# Patient Record
Sex: FEMALE | Race: WHITE
[De-identification: names, ages, dates, MRNs, and addresses within clinical notes are randomized per-mention and may not be internally consistent; named-entity substitution may affect disease eponyms.]

---

## 2019-11-18 ENCOUNTER — HOSPITAL ENCOUNTER (EMERGENCY)
Dept: HOSPITAL 57 - BURERS | Age: 34
Discharge: HOME | End: 2019-11-18
Payer: COMMERCIAL

## 2019-11-18 DIAGNOSIS — I10: ICD-10-CM

## 2019-11-18 DIAGNOSIS — G43.809: Primary | ICD-10-CM

## 2019-11-18 DIAGNOSIS — Z79.899: ICD-10-CM

## 2019-11-18 DIAGNOSIS — Z87.891: ICD-10-CM

## 2019-11-18 PROCEDURE — 70450 CT HEAD/BRAIN W/O DYE: CPT

## 2019-11-18 NOTE — CT
CT BRAIN WITHOUT CONTRAST: 

 

Date: 11-18-19

 

FINDINGS: 

A noncontrast CT was done for evaluation of a migraine headache with facial numbness. 

 

The ventricles are normal in size with no shift. No intracranial bleeding, mass or sign of acute stro
ke was found. There is good gray white distention. The visible paranasal sinuses and mastoid air cell
s are clear. 

 

IMPRESSION: 

No acute intracranial findings. 

 

POS: HOME